# Patient Record
Sex: FEMALE | Race: BLACK OR AFRICAN AMERICAN
[De-identification: names, ages, dates, MRNs, and addresses within clinical notes are randomized per-mention and may not be internally consistent; named-entity substitution may affect disease eponyms.]

---

## 2017-04-14 ENCOUNTER — HOSPITAL ENCOUNTER (OUTPATIENT)
Dept: HOSPITAL 53 - M RAD | Age: 37
End: 2017-04-14
Attending: SPECIALIST
Payer: OTHER GOVERNMENT

## 2017-04-14 DIAGNOSIS — K21.9: Primary | ICD-10-CM

## 2017-04-14 NOTE — REP
ESOPHAGRAM WITH BARIUM SWALLOW AND KUB: 04/14/2017.

 

Clinical history:  Gastroesophageal reflux disease without esophagitis.

 

Findings:  film shows the chest well inflated with the lungs clear.  CP

angles sharply defined.  The heart, mediastinal and hilar contours and the airway

were normal.  Bones intact.  No free air.

 

AP and lateral cine esophagram images were performed.  These show normal oral

pharyngeal transfer and appropriate elevation of the cricopharyngeus muscle.

There was no laryngeal penetration or aspiration.  The thoracic esophagus shows

normal distensibility and no persistent mucosal abnormality, mass or extrinsic

mass effect.  There is some mild dysmotility.  I see no evidence of an hiatal

hernia.  However, there was some reflux into the lower esophageal segment during

this examination.

 

Impression:

 

1.  No cervical esophageal stricture, mass or filling defect nor any extrinsic

mass effect.  The cricopharyngeus muscle elevated appropriately.  No prevertebral

swelling.

 

2. Thoracic esophagus with some mild dysmotility but no hiatal hernia, stricture,

mass, extrinsic mass effect or persistent mucosal lesion.  There were a few

episodes of small amounts of gastroesophageal reflux into the lower esophageal

segment observed fluoroscopically.

 

Fluoroscopy time:  1 minute 13 seconds.

 

 

Signed by

Efren Linn MD 04/14/2017 03:03 P

## 2018-01-16 ENCOUNTER — HOSPITAL ENCOUNTER (EMERGENCY)
Dept: HOSPITAL 53 - M ED | Age: 38
LOS: 1 days | Discharge: HOME | End: 2018-01-17
Payer: COMMERCIAL

## 2018-01-16 DIAGNOSIS — D25.9: ICD-10-CM

## 2018-01-16 DIAGNOSIS — N93.9: Primary | ICD-10-CM

## 2018-01-16 LAB
ANION GAP: 5 MEQ/L (ref 8–16)
BASO #: 0 10^3/UL (ref 0–0.2)
BASO %: 0.4 % (ref 0–1)
BLOOD UREA NITROGEN: 9 MG/DL (ref 7–18)
CALCIUM LEVEL: 8.3 MG/DL (ref 8.5–10.1)
CARBON DIOXIDE LEVEL: 27 MEQ/L (ref 21–32)
CHLORIDE LEVEL: 108 MEQ/L (ref 98–107)
CONTROL LINE HCG: (no result)
CREATININE FOR GFR: 0.83 MG/DL (ref 0.55–1.02)
EOS #: 0.1 10^3/UL (ref 0–0.5)
EOSINOPHIL NFR BLD AUTO: 1.7 % (ref 0–3)
GFR SERPL CREATININE-BSD FRML MDRD: > 60 ML/MIN/{1.73_M2} (ref 60–?)
GLUCOSE, FASTING: 96 MG/DL (ref 70–105)
HCG, SERUM QUALITATIVE: NEGATIVE
HEMATOCRIT: 35 % (ref 36–47)
HEMOGLOBIN: 11.5 G/DL (ref 12–16)
IMMATURE GRANULOCYTE #: 0 10^3/UL (ref 0–0)
IMMATURE GRANULOCYTE %: 0.2 % (ref 0–0)
LYMPH #: 1.7 10^3/UL (ref 1.5–4.5)
LYMPH %: 36.4 % (ref 24–44)
MEAN CORPUSCULAR HEMOGLOBIN: 27.5 PG (ref 27–33)
MEAN CORPUSCULAR HGB CONC: 32.9 G/DL (ref 32–36.5)
MEAN CORPUSCULAR VOLUME: 83.7 FL (ref 80–96)
MONO #: 0.4 10^3/UL (ref 0–0.8)
MONO %: 9.5 % (ref 0–5)
NEUTROPHILS #: 2.4 10^3/UL (ref 1.8–7.7)
NEUTROPHILS %: 51.8 % (ref 36–66)
NRBC BLD AUTO-RTO: 0 % (ref 0–0)
PLATELET COUNT, AUTOMATED: 283 10^3/UL (ref 150–450)
POTASSIUM SERUM: 3.5 MEQ/L (ref 3.5–5.1)
RED BLOOD COUNT: 4.18 10^6/UL (ref 4–5.4)
RED CELL DISTRIBUTION WIDTH: 13.5 % (ref 11.5–14.5)
SODIUM LEVEL: 140 MEQ/L (ref 136–145)
WHITE BLOOD COUNT: 4.6 10^3/UL (ref 4–10)

## 2018-01-16 PROCEDURE — 76856 US EXAM PELVIC COMPLETE: CPT
